# Patient Record
(demographics unavailable — no encounter records)

---

## 2025-01-06 NOTE — HISTORY OF PRESENT ILLNESS
[de-identified] : Follow-up status post left elbow open reduction internal fixation olecranon fracture performed on 11/15/2024  Doing well since I last saw her.  Pain is controlled.  Feels that the elbow is a bit stiff.  Otherwise feels that things are moving the right direction

## 2025-01-06 NOTE — ASSESSMENT
[FreeTextEntry1] : Status post left elbow open reduction internal fixation of olecranon fracture on 11/15/2024

## 2025-01-06 NOTE — PHYSICAL EXAM
[de-identified] : Well-appearing female in no acute distress.  Alert and oriented x 3.  Appears her stated age.  Normal mood and affect.  She is lacking about 15 degrees of extension.  She has near full flexion today.  She has full pronation and supination.  Incision is healed well.  No evidence of any erythema or drainage. [de-identified] : Multiple views left elbow ordered and reviewed.  These demonstrate the plate and screw construct in appropriate position.  No change in alignment since prior films

## 2025-01-06 NOTE — DISCUSSION/SUMMARY
[de-identified] : I had a long discussion with the patient.  I think things are moving in the right direction.  I think she would benefit from a one-time steroid injection followed by continued therapy.  She was amenable to this.  Will see how she does over the next several weeks.  We also discussed use of anti-inflammatory medications as well as the risks and benefits.  At some point she may want to have the plate taken out but I told her I would not do this before 1 year after surgery which she understands.  All of her questions were answered, she agrees with the plan after obtaining verbal consent and under sterile conditions, 1cc of 1% lidocaine, 1cc of 0.25% Marcaine and 1cc of 40mg of Kenalog was injected into the elbow via the soft spot portal. The patient tolerated the procedure well

## 2025-05-05 NOTE — HISTORY OF PRESENT ILLNESS
[de-identified] : She started to have some redness around her incision.  She was given antibiotics a while back as she has some redness and tenderness dissipated.  Currently main complaint is a mild amount of discomfort and some redness around the posterior aspect of her left elbow.  Denies any fevers, chills, night sweats.  Denies any drainage.

## 2025-05-05 NOTE — REASON FOR VISIT
[Follow-Up Visit] : a follow-up visit for [FreeTextEntry2] : Left elbow pain in the setting of a previous open reduction internal fixation of an olecranon fracture

## 2025-05-05 NOTE — DISCUSSION/SUMMARY
[de-identified] : I did very long discussion with the patient.  She has had 2 episodes of swelling around the elbow.  She is never had any drainage but has had some erythema.  I explained to her they can certainly consider removal of hardware as she is in [place] right now.  If the elbow does not continue to swell and she does not have any constitutional symptoms and we can continue to observe this.  She is currently on antibiotics.  We also discussed use of anti-inflammatory medications as well as the risks and benefits.  I will see her back in the office next time she is in [place] in 2 to 3 months.  All of her questions were answered, she agrees with the plan

## 2025-05-05 NOTE — ASSESSMENT
[FreeTextEntry1] : Left elbow swelling in the setting of a previous open reduction internal fixation of an olecranon fracture

## 2025-05-05 NOTE — PHYSICAL EXAM
[de-identified] : Through our telemedicine visit today she stated that she had near full range of motion of her elbow.  Denied any drainage around the posterior aspect of the elbow but did have some erythema.  Neurovascular intact in her right upper extremity